# Patient Record
Sex: FEMALE | Race: WHITE | HISPANIC OR LATINO | Employment: FULL TIME | URBAN - METROPOLITAN AREA
[De-identification: names, ages, dates, MRNs, and addresses within clinical notes are randomized per-mention and may not be internally consistent; named-entity substitution may affect disease eponyms.]

---

## 2017-01-16 ENCOUNTER — OFFICE VISIT (OUTPATIENT)
Dept: CARDIOLOGY | Facility: MEDICAL CENTER | Age: 27
End: 2017-01-16
Payer: MEDICAID

## 2017-01-16 VITALS
SYSTOLIC BLOOD PRESSURE: 110 MMHG | BODY MASS INDEX: 20.16 KG/M2 | OXYGEN SATURATION: 100 % | HEART RATE: 78 BPM | DIASTOLIC BLOOD PRESSURE: 70 MMHG | WEIGHT: 100 LBS | HEIGHT: 59 IN

## 2017-01-16 DIAGNOSIS — R00.2 PALPITATIONS: ICD-10-CM

## 2017-01-16 PROCEDURE — 99214 OFFICE O/P EST MOD 30 MIN: CPT | Performed by: INTERNAL MEDICINE

## 2017-01-16 ASSESSMENT — ENCOUNTER SYMPTOMS
BRUISES/BLEEDS EASILY: 0
PND: 0
WEIGHT LOSS: 0
NERVOUS/ANXIOUS: 1
MUSCULOSKELETAL NEGATIVE: 1
COUGH: 0
EYES NEGATIVE: 1
MEMORY LOSS: 0
DEPRESSION: 0
INSOMNIA: 0
DIZZINESS: 0
BLURRED VISION: 0
LOSS OF CONSCIOUSNESS: 0
SHORTNESS OF BREATH: 0

## 2017-01-16 NOTE — MR AVS SNAPSHOT
"        Lotus Saez   2017 3:45 PM   Office Visit   MRN: [id number]    Department:  [facility]   Dept Phone:  [phone]    Description:  Female : 1990   Provider:  Faby Carlos M.D.           Allergies as of 2017     No Known Allergies      You were diagnosed with     Palpitations   [785.1.ICD-9-CM]         Vital Signs     Blood Pressure Pulse Height Weight Body Mass Index Oxygen Saturation    110/70 mmHg 78 1.499 m (4' 11\") 45.36 kg (100 lb) 20.19 kg/m2 100%    Smoking Status                   Never Smoker            Basic Information     Date Of Birth Sex Race Ethnicity Preferred Language    1990 Female White  Origin (Hungarian,South Korean,Vietnamese,Joshua, etc) English      Problem List              ICD-10-CM Priority Class Noted - Resolved    Palpitations R00.2   2017 - Present      Health Maintenance        Date Due Completion Dates    IMM DTaP/Tdap/Td Vaccine (1 - Tdap) 8/10/2007 ---    PAP SMEAR 8/10/2009 ---    IMM INFLUENZA (1) 2016 ---            Current Immunizations     No immunizations on file.      Below and/or attached are the medications your provider expects you to take. Review all of your home medications and newly ordered medications with your provider and/or pharmacist. Follow medication instructions as directed by your provider and/or pharmacist. Please keep your medication list with you and share with your provider. Update the information when medications are discontinued, doses are changed, or new medications (including over-the-counter products) are added; and carry medication information at all times in the event of emergency situations     Allergies:  No Known Allergies          Medications  Valid as of: 2017 -  4:00 PM    Generic Name Brand Name Tablet Size Instructions for use    Cod Liver Oil   Take  by mouth.        DiltiaZEM HCl Coated Beads (CAPSULE SR 24 HR) CARDIZEM  MG Take 1 Cap by mouth every day.       " MethIMAzole (Tab) TAPAZOLE 5 MG Take 1 Tab by mouth every day.        .                 Medicines prescribed today were sent to:     [id number] - [place], [place] - [address]    [address] [facility] [address] [facility] [place] [address]    Phone: [phone] Fax: [phone]    Open 24 Hours?: No      Medication refill instructions:       If your prescription bottle indicates you have medication refills left, it is not necessary to call your provider’s office. Please contact your pharmacy and they will refill your medication.    If your prescription bottle indicates you do not have any refills left, you may request refills at any time through one of the following ways: The online Kynded system (except Urgent Care), by calling your provider’s office, or by asking your pharmacy to contact your provider’s office with a refill request. Medication refills are processed only during regular business hours and may not be available until the next business day. Your provider may request additional information or to have a follow-up visit with you prior to refilling your medication.   *Please Note: Medication refills are assigned a new Rx number when refilled electronically. Your pharmacy may indicate that no refills were authorized even though a new prescription for the same medication is available at the pharmacy. Please request the medicine by name with the pharmacy before contacting your provider for a refill.           Kynded Access Code: [email or web address]  Expires: 2/15/2017  4:00 PM    Kynded  A secure, online tool to manage your health information     [employer]’s Kynded® is a secure, online tool that connects you to your personalized health information from the privacy of your home -- day or night - making it very easy for you to manage your healthcare. Once the activation process is completed, you can even access your medical information using the Kynded kelley, which is available for free in the Apple Kelley store or Google Play  store.     Accelera provides the following levels of access (as shown below):   My Chart Features   [facility] Doctor [facility]  Specialists [facility]  Urgent  Care [facility]  Primary Care  Doctor   Email your healthcare team securely and privately 24/7 X X X    Manage appointments: schedule your next appointment; view details of past/upcoming appointments X      Request prescription refills. X      View recent personal medical records, including lab and immunizations X X X X   View health record, including health history, allergies, medications X X X X   Read reports about your outpatient visits, procedures, consult and ER notes X X X X   See your discharge summary, which is a recap of your hospital and/or ER visit that includes your diagnosis, lab results, and care plan. X X       How to register for Accelera:  1. Go to  [email or web address].  2. Click on the Sign Up Now box, which takes you to the New Member Sign Up page. You will need to provide the following information:  a. Enter your Accelera Access Code exactly as it appears at the top of this page. (You will not need to use this code after you’ve completed the sign-up process. If you do not sign up before the expiration date, you must request a new code.)   b. Enter your date of birth.   c. Enter your home email address.   d. Click Submit, and follow the next screen’s instructions.  3. Create a Accelera ID. This will be your Accelera login ID and cannot be changed, so think of one that is secure and easy to remember.  4. Create a Accelera password. You can change your password at any time.  5. Enter your Password Reset Question and Answer. This can be used at a later time if you forget your password.   6. Enter your e-mail address. This allows you to receive e-mail notifications when new information is available in Accelera.  7. Click Sign Up. You can now view your health information.    For assistance activating your Accelera account, call [phone]  [phone]

## 2017-01-16 NOTE — Clinical Note
[facility]   [address], [address]  [place] [address]  Phone: [phone]  Fax: [phone]              Lotus Saez  1990    Encounter Date: 1/16/2017    Faby Carlos M.D.          PROGRESS NOTE:  Subjective:   Lotus Saez is a 26 y.o. female who presents today in follow-up in regards to her palpitations    She is switching care from her primary cardiologist who she saw 3 months ago    She is in with her partner, he is reading on his cell phone and does not communicate with me      She states that she stays at home with her 2-year-old and 5-year-old. She has had a very stressful year but denies trauma or abuse. She is a business major    She did not take the medications. She says she did not see eye to eye with her other cardiologist    History reviewed. No pertinent past medical history.  History reviewed. No pertinent past surgical history.  Family History   Problem Relation Age of Onset   • Heart Disease Mother      History   Smoking status   • Never Smoker    Smokeless tobacco   • Never Used     No Known Allergies  Outpatient Encounter Prescriptions as of 1/16/2017   Medication Sig Dispense Refill   • methimazole (TAPAZOLE) 5 MG Tab Take 1 Tab by mouth every day.  2   • Cod Liver Oil (COD LIVER PO) Take  by mouth.     • diltiazem CD (CARDIZEM CD) 120 MG CAPSULE SR 24 HR Take 1 Cap by mouth every day. (Patient not taking: Reported on 1/16/2017) 30 Cap 3     No facility-administered encounter medications on file as of 1/16/2017.     Review of Systems   Constitutional: Negative for weight loss and malaise/fatigue.   Eyes: Negative.  Negative for blurred vision.   Respiratory: Negative for cough and shortness of breath.    Cardiovascular: Negative for chest pain, leg swelling and PND.   Musculoskeletal: Negative.    Neurological: Negative for dizziness and loss of consciousness.   Endo/Heme/Allergies: Does not bruise/bleed easily.    "  Psychiatric/Behavioral: Negative for depression, suicidal ideas and memory loss. The patient is nervous/anxious. The patient does not have insomnia.    All other systems reviewed and are negative.       Objective:   /70 mmHg  Pulse 78  Ht 1.499 m (4' 11\")  Wt 45.36 kg (100 lb)  BMI 20.19 kg/m2  SpO2 100%    Physical Exam   Constitutional: She is oriented to person, place, and time.   Thin, tremulous, anxious and diminutive   Cardiovascular: Normal rate, regular rhythm and intact distal pulses.    No murmur heard.  Pulmonary/Chest: Breath sounds normal.   Musculoskeletal: She exhibits no edema.   Neurological: She is alert and oriented to person, place, and time.   Skin: Skin is warm and dry.       Assessment:     1. Palpitations         Medical Decision Making:  Today's Assessment / Status / Plan:     I personally looked at the images of her echocardiogram done last year. It was normal and reassuring. We looked at her blood work and her TSH in August was normal    Hyperthyroidism on methimazole area normal thyroid axis. Palpitations with a structurally normal heart    We looked at her echo together, we spoke for more than 25 minutes about benign palpitations. We talked about stressors and hyperthyroidism. She'll follow with her primary care doctor and let me know if needed. I told her she can take her diltiazem as needed. She voices understanding and will let me know if she has setbacks    I will be happy to order a three-week monitor if needed      Honey Colon PA-C  [address]  [place] [id number]  VIA Facsimile: [phone]                   "

## 2017-01-17 NOTE — PROGRESS NOTES
"Subjective:   Lotus Saez is a 26 y.o. female who presents today in follow-up in regards to her palpitations    She is switching care from her primary cardiologist who she saw 3 months ago    She is in with her partner, he is reading on his cell phone and does not communicate with me      She states that she stays at home with her 2-year-old and 5-year-old. She has had a very stressful year but denies trauma or abuse. She is a business major    She did not take the medications. She says she did not see eye to eye with her other cardiologist    History reviewed. No pertinent past medical history.  History reviewed. No pertinent past surgical history.  Family History   Problem Relation Age of Onset   • Heart Disease Mother      History   Smoking status   • Never Smoker    Smokeless tobacco   • Never Used     No Known Allergies  Outpatient Encounter Prescriptions as of 1/16/2017   Medication Sig Dispense Refill   • methimazole (TAPAZOLE) 5 MG Tab Take 1 Tab by mouth every day.  2   • Cod Liver Oil (COD LIVER PO) Take  by mouth.     • diltiazem CD (CARDIZEM CD) 120 MG CAPSULE SR 24 HR Take 1 Cap by mouth every day. (Patient not taking: Reported on 1/16/2017) 30 Cap 3     No facility-administered encounter medications on file as of 1/16/2017.     Review of Systems   Constitutional: Negative for weight loss and malaise/fatigue.   Eyes: Negative.  Negative for blurred vision.   Respiratory: Negative for cough and shortness of breath.    Cardiovascular: Negative for chest pain, leg swelling and PND.   Musculoskeletal: Negative.    Neurological: Negative for dizziness and loss of consciousness.   Endo/Heme/Allergies: Does not bruise/bleed easily.   Psychiatric/Behavioral: Negative for depression, suicidal ideas and memory loss. The patient is nervous/anxious. The patient does not have insomnia.    All other systems reviewed and are negative.       Objective:   /70 mmHg  Pulse 78  Ht 1.499 m (4' 11\")  Wt 45.36 " kg (100 lb)  BMI 20.19 kg/m2  SpO2 100%    Physical Exam   Constitutional: She is oriented to person, place, and time.   Thin, tremulous, anxious and diminutive   Cardiovascular: Normal rate, regular rhythm and intact distal pulses.    No murmur heard.  Pulmonary/Chest: Breath sounds normal.   Musculoskeletal: She exhibits no edema.   Neurological: She is alert and oriented to person, place, and time.   Skin: Skin is warm and dry.       Assessment:     1. Palpitations         Medical Decision Making:  Today's Assessment / Status / Plan:     I personally looked at the images of her echocardiogram done last year. It was normal and reassuring. We looked at her blood work and her TSH in August was normal    Hyperthyroidism on methimazole area normal thyroid axis. Palpitations with a structurally normal heart    We looked at her echo together, we spoke for more than 25 minutes about benign palpitations. We talked about stressors and hyperthyroidism. She'll follow with her primary care doctor and let me know if needed. I told her she can take her diltiazem as needed. She voices understanding and will let me know if she has setbacks    I will be happy to order a three-week monitor if needed

## 2019-11-14 ENCOUNTER — OFFICE VISIT (OUTPATIENT)
Dept: URGENT CARE | Facility: PHYSICIAN GROUP | Age: 29
End: 2019-11-14
Payer: COMMERCIAL

## 2019-11-14 VITALS
RESPIRATION RATE: 12 BRPM | OXYGEN SATURATION: 99 % | HEART RATE: 81 BPM | BODY MASS INDEX: 23.59 KG/M2 | TEMPERATURE: 98.7 F | SYSTOLIC BLOOD PRESSURE: 116 MMHG | HEIGHT: 59 IN | WEIGHT: 117 LBS | DIASTOLIC BLOOD PRESSURE: 70 MMHG

## 2019-11-14 DIAGNOSIS — J31.0 RHINITIS, UNSPECIFIED TYPE: ICD-10-CM

## 2019-11-14 DIAGNOSIS — H66.002 NON-RECURRENT ACUTE SUPPURATIVE OTITIS MEDIA OF LEFT EAR WITHOUT SPONTANEOUS RUPTURE OF TYMPANIC MEMBRANE: ICD-10-CM

## 2019-11-14 PROCEDURE — 99204 OFFICE O/P NEW MOD 45 MIN: CPT | Performed by: NURSE PRACTITIONER

## 2019-11-14 RX ORDER — AMOXICILLIN AND CLAVULANATE POTASSIUM 875; 125 MG/1; MG/1
1 TABLET, FILM COATED ORAL 2 TIMES DAILY
Qty: 20 TAB | Refills: 0 | Status: SHIPPED | OUTPATIENT
Start: 2019-11-14 | End: 2019-11-24

## 2019-11-14 RX ORDER — FLUTICASONE PROPIONATE 50 MCG
SPRAY, SUSPENSION (ML) NASAL
Qty: 16 G | Refills: 0 | Status: SHIPPED | OUTPATIENT
Start: 2019-11-14 | End: 2022-01-13

## 2019-11-14 RX ORDER — DULOXETIN HYDROCHLORIDE 60 MG/1
60 CAPSULE, DELAYED RELEASE ORAL DAILY
COMMUNITY
Start: 2019-11-12 | End: 2021-11-05 | Stop reason: SDUPTHER

## 2019-11-14 SDOH — HEALTH STABILITY: MENTAL HEALTH: HOW OFTEN DO YOU HAVE A DRINK CONTAINING ALCOHOL?: NEVER

## 2019-11-14 ASSESSMENT — ENCOUNTER SYMPTOMS
NEUROLOGICAL NEGATIVE: 1
CARDIOVASCULAR NEGATIVE: 1
FEVER: 0
CONSTITUTIONAL NEGATIVE: 1
COUGH: 1
SHORTNESS OF BREATH: 0

## 2019-11-14 NOTE — PROGRESS NOTES
"Subjective:     Lotus Saez is a 29 y.o. female who presents for Otalgia (x 3 days, L ear, poss infection, sharp pain)       Otalgia    There is pain in the left ear. This is a new problem. Episode onset: 4 days ago. The problem has been gradually worsening. Associated symptoms include coughing. Associated symptoms comments: Nasal congestion. Ear infections     PMH:  has no past medical history on file.    MEDS:   Current Outpatient Medications:   •  DULoxetine (CYMBALTA) 60 MG Cap DR Particles delayed-release capsule, Take 60 mg by mouth every day., Disp: , Rfl:   •  amoxicillin-clavulanate (AUGMENTIN) 875-125 MG Tab, Take 1 Tab by mouth 2 times a day for 10 days., Disp: 20 Tab, Rfl: 0  •  fluticasone (FLONASE) 50 MCG/ACT nasal spray, 2 sprays in each nostril once a day, Disp: 16 g, Rfl: 0  •  methimazole (TAPAZOLE) 5 MG Tab, Take 1 Tab by mouth every day., Disp: , Rfl: 2  •  Cod Liver Oil (COD LIVER PO), Take  by mouth., Disp: , Rfl:   •  diltiazem CD (CARDIZEM CD) 120 MG CAPSULE SR 24 HR, Take 1 Cap by mouth every day. (Patient not taking: Reported on 1/16/2017), Disp: 30 Cap, Rfl: 3    ALLERGIES: No Known Allergies    SURGHX: History reviewed. No pertinent surgical history.    SOCHX:  reports that she has never smoked. She has never used smokeless tobacco. She reports that she does not drink alcohol or use drugs.     FH: Reviewed with patient, not pertinent to this visit.    Review of Systems   Constitutional: Negative.  Negative for fever and malaise/fatigue.   HENT: Positive for congestion and ear pain.    Respiratory: Positive for cough. Negative for shortness of breath.    Cardiovascular: Negative.    Neurological: Negative.    All other systems reviewed and are negative.    Objective:     /70   Pulse 81   Temp 37.1 °C (98.7 °F) (Temporal)   Resp 12   Ht 1.499 m (4' 11\")   Wt 53.1 kg (117 lb)   SpO2 99%   BMI 23.63 kg/m²     Physical Exam  Vitals signs reviewed.   Constitutional:       " General: She is not in acute distress.     Appearance: She is well-developed. She is not toxic-appearing or diaphoretic.   HENT:      Head: Normocephalic.      Right Ear: Tympanic membrane and external ear normal.      Left Ear: External ear normal. A middle ear effusion is present. Tympanic membrane is erythematous and bulging. Tympanic membrane is not perforated.      Nose: Mucosal edema and rhinorrhea present.      Mouth/Throat:      Mouth: Mucous membranes are moist.      Pharynx: Oropharynx is clear. Uvula midline.   Eyes:      Conjunctiva/sclera: Conjunctivae normal.      Pupils: Pupils are equal, round, and reactive to light.   Neck:      Musculoskeletal: Normal range of motion.   Cardiovascular:      Rate and Rhythm: Normal rate and regular rhythm.      Pulses: Normal pulses.      Heart sounds: Normal heart sounds.   Pulmonary:      Effort: Pulmonary effort is normal. No respiratory distress.      Breath sounds: Normal breath sounds. No decreased breath sounds.   Abdominal:      General: Bowel sounds are normal.      Palpations: Abdomen is soft.      Tenderness: There is no tenderness.   Musculoskeletal: Normal range of motion.         General: No deformity.   Lymphadenopathy:      Cervical: No cervical adenopathy.   Skin:     General: Skin is warm and dry.      Coloration: Skin is not pale.   Neurological:      Mental Status: She is alert and oriented to person, place, and time.      Sensory: No sensory deficit.      Coordination: Coordination normal.   Psychiatric:         Behavior: Behavior normal. Behavior is cooperative.          Assessment/Plan:     1. Non-recurrent acute suppurative otitis media of left ear without spontaneous rupture of tympanic membrane  - amoxicillin-clavulanate (AUGMENTIN) 875-125 MG Tab; Take 1 Tab by mouth 2 times a day for 10 days.  Dispense: 20 Tab; Refill: 0    2. Rhinitis, unspecified type  - fluticasone (FLONASE) 50 MCG/ACT nasal spray; 2 sprays in each nostril once a day   Dispense: 16 g; Refill: 0    Rx as above sent electronically.    Discussed decongestants (e.g. Sudafed), antihistamines, Flonase, and nasal saline rinses/neti pot.    Discussed close monitoring and supportive measures including increasing fluids and rest.    Vital signs stable, afebrile, asymptomatic, no acute distress.    Warning signs reviewed. Return precautions discussed.    Patient advised to: Return for 1) Symptoms don't improve or worsen, or go to ER, 2) Follow up with primary care in 7-10 days.    Differential diagnosis, natural history, supportive care, and indications for immediate follow-up discussed. All questions answered. Patient agrees with the plan of care.    Billing note: patient billed as a new patient as the patient has not received any professional medical services from myself or another provider of the same specialty (family medicine) who belongs to the same group practice within the previous 3 years.

## 2021-11-05 RX ORDER — AMITRIPTYLINE HYDROCHLORIDE 10 MG/1
10 TABLET, FILM COATED ORAL NIGHTLY
COMMUNITY
Start: 2021-08-24 | End: 2022-10-10 | Stop reason: SDUPTHER

## 2021-11-05 RX ORDER — AMITRIPTYLINE HYDROCHLORIDE 10 MG/1
TABLET, FILM COATED ORAL
COMMUNITY
Start: 2021-08-24 | End: 2021-11-05

## 2021-11-09 RX ORDER — DULOXETIN HYDROCHLORIDE 60 MG/1
60 CAPSULE, DELAYED RELEASE ORAL DAILY
Qty: 90 CAPSULE | Refills: 0 | Status: SHIPPED | OUTPATIENT
Start: 2021-11-09 | End: 2022-02-25 | Stop reason: SDUPTHER

## 2022-01-13 ENCOUNTER — OFFICE VISIT (OUTPATIENT)
Dept: URGENT CARE | Facility: PHYSICIAN GROUP | Age: 32
End: 2022-01-13

## 2022-01-13 VITALS
OXYGEN SATURATION: 99 % | HEIGHT: 59 IN | WEIGHT: 120 LBS | TEMPERATURE: 98.2 F | BODY MASS INDEX: 24.19 KG/M2 | DIASTOLIC BLOOD PRESSURE: 68 MMHG | HEART RATE: 79 BPM | RESPIRATION RATE: 20 BRPM | SYSTOLIC BLOOD PRESSURE: 122 MMHG

## 2022-01-13 DIAGNOSIS — H66.92 ACUTE LEFT OTITIS MEDIA: ICD-10-CM

## 2022-01-13 DIAGNOSIS — H60.312 ACUTE DIFFUSE OTITIS EXTERNA OF LEFT EAR: ICD-10-CM

## 2022-01-13 DIAGNOSIS — H92.02 ACUTE OTALGIA, LEFT: ICD-10-CM

## 2022-01-13 PROCEDURE — 99213 OFFICE O/P EST LOW 20 MIN: CPT | Performed by: FAMILY MEDICINE

## 2022-01-13 RX ORDER — AMOXICILLIN 500 MG/1
500 CAPSULE ORAL 3 TIMES DAILY
Qty: 21 CAPSULE | Refills: 0 | Status: SHIPPED | OUTPATIENT
Start: 2022-01-13 | End: 2022-01-20

## 2022-01-13 ASSESSMENT — ENCOUNTER SYMPTOMS
VOMITING: 0
CHILLS: 0
DIZZINESS: 0
FEVER: 0
SORE THROAT: 0
MYALGIAS: 0
NAUSEA: 0
COUGH: 0
SHORTNESS OF BREATH: 0

## 2022-01-14 NOTE — PATIENT INSTRUCTIONS
Otitis Media, Adult    Otitis media means that the middle ear is red and swollen (inflamed) and full of fluid. The condition usually goes away on its own.  Follow these instructions at home:  · Take over-the-counter and prescription medicines only as told by your doctor.  · If you were prescribed an antibiotic medicine, take it as told by your doctor. Do not stop taking the antibiotic even if you start to feel better.  · Keep all follow-up visits as told by your doctor. This is important.  Contact a doctor if:  · You have bleeding from your nose.  · There is a lump on your neck.  · You are not getting better in 5 days.  · You feel worse instead of better.  Get help right away if:  · You have pain that is not helped with medicine.  · You have swelling, redness, or pain around your ear.  · You get a stiff neck.  · You cannot move part of your face (paralyzed).  · You notice that the bone behind your ear hurts when you touch it.  · You get a very bad headache.  Summary  · Otitis media means that the middle ear is red, swollen, and full of fluid.  · This condition usually goes away on its own. In some cases, treatment may be needed.  · If you were prescribed an antibiotic medicine, take it as told by your doctor.  This information is not intended to replace advice given to you by your health care provider. Make sure you discuss any questions you have with your health care provider.  Document Released: 06/05/2009 Document Revised: 11/30/2018 Document Reviewed: 01/08/2018  Elsevier Patient Education © 2020 Elsevier Inc[employer]  Otitis Externa    Otitis externa is an infection of the outer ear canal. The outer ear canal is the area between the outside of the ear and the eardrum. Otitis externa is sometimes called swimmer's ear.  What are the causes?  Common causes of this condition include:  · Swimming in dirty water.  · Moisture in the ear.  · An injury to the inside of the ear.  · An object stuck in the ear.  · A cut or scrape  on the outside of the ear.  What increases the risk?  You are more likely to get this condition if you go swimming often.  What are the signs or symptoms?  · Itching in the ear. This is often the first symptom.  · Swelling of the ear.  · Redness in the ear.  · Ear pain. The pain may get worse when you pull on your ear.  · Pus coming from the ear.  How is this treated?  This condition may be treated with:  · Antibiotic ear drops. These are often given for 10-14 days.  · Medicines to reduce itching and swelling.  Follow these instructions at home:  · If you were given antibiotic ear drops, use them as told by your doctor. Do not stop using them even if your condition gets better.  · Take over-the-counter and prescription medicines only as told by your doctor.  · Avoid getting water in your ears as told by your doctor. You may be told to avoid swimming or water sports for a few days.  · Keep all follow-up visits as told by your doctor. This is important.  How is this prevented?  · Keep your ears dry. Use the corner of a towel to dry your ears after you swim or bathe.  · Try not to scratch or put things in your ear. Doing these things makes it easier for germs to grow in your ear.  · Avoid swimming in lakes, dirty water, or pools that may not have the right amount of a chemical called chlorine.  Contact a doctor if:  · You have a fever.  · Your ear is still red, swollen, or painful after 3 days.  · You still have pus coming from your ear after 3 days.  · Your redness, swelling, or pain gets worse.  · You have a really bad headache.  · You have redness, swelling, pain, or tenderness behind your ear.  Summary  · Otitis externa is an infection of the outer ear canal.  · Symptoms include pain, redness, and swelling of the ear.  · If you were given antibiotic ear drops, use them as told by your doctor. Do not stop using them even if your condition gets better.  · Try not to scratch or put things in your ear.  This  information is not intended to replace advice given to you by your health care provider. Make sure you discuss any questions you have with your health care provider.  Document Released: 06/05/2009 Document Revised: 05/24/2019 Document Reviewed: 05/24/2019  Elsevier Patient Education © 2020 Elsevier Inc.

## 2022-01-14 NOTE — PROGRESS NOTES
Subjective:   Lotus Saez is a 31 y.o. female who presents for Ear Pain (left; last night)        Otalgia   There is pain in the left ear. This is a new problem. The current episode started yesterday. The problem occurs constantly. Pertinent negatives include no coughing, rash, sore throat or vomiting. Associated symptoms comments: Similar symptoms to previous episodes of otitis media. Treatments tried: Relative rest. The treatment provided no relief.     PMH:  has no past medical history on file.  MEDS:   Current Outpatient Medications:   •  amoxicillin (AMOXIL) 500 MG Cap, Take 1 Capsule by mouth 3 times a day for 7 days., Disp: 21 Capsule, Rfl: 0  •  neomycin sulf/polymyx B sulf/HC soln (CORTISPORIN HC SOL) 3.5-55934-0 Solution, Administer 3 Drops into the left ear 3 times a day for 7 days., Disp: 10 mL, Rfl: 1  •  DULoxetine (CYMBALTA) 60 MG Cap DR Particles delayed-release capsule, Take 1 Capsule by mouth every day., Disp: 90 Capsule, Rfl: 0  •  amitriptyline (ELAVIL) 10 MG Tab, AMITRIPTYLINE HCL 10 MG TABS, Disp: , Rfl:   •  methimazole (TAPAZOLE) 5 MG Tab, Take 1 Tab by mouth every day., Disp: , Rfl: 2  •  fluticasone (FLONASE) 50 MCG/ACT nasal spray, 2 sprays in each nostril once a day, Disp: 16 g, Rfl: 0  •  Cod Liver Oil (COD LIVER PO), Take  by mouth., Disp: , Rfl:   •  diltiazem CD (CARDIZEM CD) 120 MG CAPSULE SR 24 HR, Take 1 Cap by mouth every day. (Patient not taking: Reported on 1/16/2017), Disp: 30 Cap, Rfl: 3  ALLERGIES: No Known Allergies  SURGHX: History reviewed. No pertinent surgical history.  SOCHX:  reports that she has never smoked. She has never used smokeless tobacco. She reports that she does not drink alcohol and does not use drugs.  FH:   Family History   Problem Relation Age of Onset   • Heart Disease Mother      Review of Systems   Constitutional: Negative for chills and fever.   HENT: Positive for ear pain. Negative for sore throat.    Respiratory: Negative for cough and  "shortness of breath.    Gastrointestinal: Negative for nausea and vomiting.   Musculoskeletal: Negative for myalgias.   Skin: Negative for rash.   Neurological: Negative for dizziness.        Objective:   /68 (BP Location: Right arm, Patient Position: Sitting, BP Cuff Size: Adult)   Pulse 79   Temp 36.8 °C (98.2 °F) (Temporal)   Resp 20   Ht 1.499 m (4' 11\")   Wt 54.4 kg (120 lb)   SpO2 99%   BMI 24.24 kg/m²   Physical Exam  Vitals and nursing note reviewed.   Constitutional:       General: She is not in acute distress.     Appearance: She is well-developed.   HENT:      Head: Normocephalic and atraumatic.      Right Ear: External ear normal. Tympanic membrane is not erythematous or bulging.      Left Ear: External ear normal. Tympanic membrane is erythematous and bulging.      Ears:      Comments: Left canal erythematous, edematous     Nose: Rhinorrhea present.      Mouth/Throat:      Mouth: Mucous membranes are moist.      Pharynx: Posterior oropharyngeal erythema present. No oropharyngeal exudate.   Eyes:      Conjunctiva/sclera: Conjunctivae normal.   Cardiovascular:      Rate and Rhythm: Normal rate.   Pulmonary:      Effort: Pulmonary effort is normal. No respiratory distress.      Breath sounds: Normal breath sounds. No wheezing or rhonchi.   Abdominal:      General: There is no distension.   Musculoskeletal:         General: Normal range of motion.   Skin:     General: Skin is warm and dry.   Neurological:      General: No focal deficit present.      Mental Status: She is alert and oriented to person, place, and time. Mental status is at baseline.      Gait: Gait (gait at baseline) normal.   Psychiatric:         Judgment: Judgment normal.           Assessment/Plan:   1. Acute diffuse otitis externa of left ear  - neomycin sulf/polymyx B sulf/HC soln (CORTISPORIN HC SOL) 3.5-57250-0 Solution; Administer 3 Drops into the left ear 3 times a day for 7 days.  Dispense: 10 mL; Refill: 1    2. Acute " left otitis media  - amoxicillin (AMOXIL) 500 MG Cap; Take 1 Capsule by mouth 3 times a day for 7 days.  Dispense: 21 Capsule; Refill: 0    3. Acute otalgia, left  - amoxicillin (AMOXIL) 500 MG Cap; Take 1 Capsule by mouth 3 times a day for 7 days.  Dispense: 21 Capsule; Refill: 0  - neomycin sulf/polymyx B sulf/HC soln (CORTISPORIN HC SOL) 3.5-66768-1 Solution; Administer 3 Drops into the left ear 3 times a day for 7 days.  Dispense: 10 mL; Refill: 1        Medical Decision Making/Course:  In the course of preparing for this visit with review of the pertinent past medical history, recent and past clinic visits, current medications, and performing chart, immunization, medical history and medication reconciliation, and in the further course of obtaining the current history pertinent to the clinic visit today, performing an exam and evaluation, ordering and independently evaluating labs, tests, and/or procedures including in the context of shared decision making the patient deferred SARS-CoV-2 by PCR testing today, prescribing any recommended new medications as noted above, providing any pertinent counseling and education and recommending further coordination of care, at least  10 minutes of total time were spent during this encounter.      Discussed close monitoring, return precautions, and supportive measures of maintaining adequate fluid hydration and caloric intake, relative rest and symptom management as needed for pain and/or fever.    Differential diagnosis, natural history, supportive care, and indications for immediate follow-up discussed.     Advised the patient to follow-up with the primary care physician for recheck, reevaluation, and consideration of further management.    Please note that this dictation was created using voice recognition software. I have worked with consultants from the vendor as well as technical experts from [employer] to optimize the interface. I have made every reasonable attempt  to correct obvious errors, but I expect that there are errors of grammar and possibly content that I did not discover before finalizing the note.

## 2022-01-31 ENCOUNTER — EH NON-PROVIDER (OUTPATIENT)
Dept: OCCUPATIONAL MEDICINE | Facility: CLINIC | Age: 32
End: 2022-01-31

## 2022-01-31 ENCOUNTER — EMPLOYEE HEALTH (OUTPATIENT)
Dept: OCCUPATIONAL MEDICINE | Facility: CLINIC | Age: 32
End: 2022-01-31

## 2022-01-31 ENCOUNTER — HOSPITAL ENCOUNTER (OUTPATIENT)
Facility: MEDICAL CENTER | Age: 32
End: 2022-01-31
Attending: PREVENTIVE MEDICINE
Payer: COMMERCIAL

## 2022-01-31 DIAGNOSIS — Z02.1 PRE-EMPLOYMENT DRUG SCREENING: ICD-10-CM

## 2022-01-31 DIAGNOSIS — Z02.1 PRE-EMPLOYMENT HEALTH SCREENING EXAMINATION: ICD-10-CM

## 2022-01-31 DIAGNOSIS — Z02.1 PRE-EMPLOYMENT DRUG SCREENING: Primary | ICD-10-CM

## 2022-01-31 LAB
AMP AMPHETAMINE: NORMAL
BAR BARBITURATES: NORMAL
BZO BENZODIAZEPINES: NORMAL
COC COCAINE: NORMAL
INT CON NEG: NORMAL
INT CON POS: NORMAL
MDMA ECSTASY: NORMAL
MET METHAMPHETAMINES: NORMAL
MTD METHADONE: NORMAL
OPI OPIATES: NORMAL
OXY OXYCODONE: NORMAL
PCP PHENCYCLIDINE: NORMAL
POC URINE DRUG SCREEN OCDRS: NEGATIVE
THC: NORMAL

## 2022-01-31 PROCEDURE — 86765 RUBEOLA ANTIBODY: CPT | Performed by: PREVENTIVE MEDICINE

## 2022-01-31 PROCEDURE — 8915 PR COMPREHENSIVE PHYSICAL: Performed by: PREVENTIVE MEDICINE

## 2022-01-31 PROCEDURE — 86480 TB TEST CELL IMMUN MEASURE: CPT | Performed by: PREVENTIVE MEDICINE

## 2022-01-31 PROCEDURE — 86735 MUMPS ANTIBODY: CPT | Performed by: PREVENTIVE MEDICINE

## 2022-01-31 PROCEDURE — 86762 RUBELLA ANTIBODY: CPT | Performed by: PREVENTIVE MEDICINE

## 2022-01-31 PROCEDURE — 80305 DRUG TEST PRSMV DIR OPT OBS: CPT | Performed by: PREVENTIVE MEDICINE

## 2022-01-31 PROCEDURE — 86787 VARICELLA-ZOSTER ANTIBODY: CPT | Performed by: PREVENTIVE MEDICINE

## 2022-02-02 LAB
GAMMA INTERFERON BACKGROUND BLD IA-ACNC: 0.05 IU/ML
M TB IFN-G BLD-IMP: NEGATIVE
M TB IFN-G CD4+ BCKGRND COR BLD-ACNC: 0.07 IU/ML
MEV IGG SER IA-ACNC: 0.29
MITOGEN IGNF BCKGRD COR BLD-ACNC: >10 IU/ML
MUV IGG SER IA-ACNC: 0.88
QFT TB2 - NIL TBQ2: 0.04 IU/ML
RUBV AB SER QL: 63.8 IU/ML
VZV IGG SER IA-ACNC: 0.04

## 2022-02-04 ENCOUNTER — PATIENT MESSAGE (OUTPATIENT)
Dept: MEDICAL GROUP | Facility: MEDICAL CENTER | Age: 32
End: 2022-02-04

## 2022-02-04 NOTE — PATIENT COMMUNICATION
Phone Number Called: [phone] (home) [phone] (work)    Call outcome: sent pt a mess via my chart informing of lab results show low titers of MMR and varicella. I offered pt to schedule an bibi for vaccines but pt needs to check with her supervisor first. Pt. Will call back.

## 2022-02-26 ENCOUNTER — OFFICE VISIT (OUTPATIENT)
Dept: URGENT CARE | Facility: PHYSICIAN GROUP | Age: 32
End: 2022-02-26
Payer: COMMERCIAL

## 2022-02-26 VITALS
BODY MASS INDEX: 23.47 KG/M2 | OXYGEN SATURATION: 100 % | HEART RATE: 79 BPM | TEMPERATURE: 97.6 F | HEIGHT: 59 IN | SYSTOLIC BLOOD PRESSURE: 118 MMHG | WEIGHT: 116.4 LBS | DIASTOLIC BLOOD PRESSURE: 76 MMHG | RESPIRATION RATE: 14 BRPM

## 2022-02-26 DIAGNOSIS — H61.22 IMPACTED CERUMEN OF LEFT EAR: ICD-10-CM

## 2022-02-26 DIAGNOSIS — H65.02 ACUTE SEROUS OTITIS MEDIA OF LEFT EAR WITHOUT RUPTURE: ICD-10-CM

## 2022-02-26 PROBLEM — M79.7 FIBROMYALGIA: Status: ACTIVE | Noted: 2017-11-07

## 2022-02-26 PROBLEM — R22.30 SHOULDER MASS: Status: ACTIVE | Noted: 2018-08-29

## 2022-02-26 PROBLEM — L65.9 HAIR LOSS: Status: ACTIVE | Noted: 2018-06-05

## 2022-02-26 PROBLEM — N93.8 OTHER SPECIFIED ABNORMAL UTERINE AND VAGINAL BLEEDING: Status: ACTIVE | Noted: 2017-04-10

## 2022-02-26 PROBLEM — L08.9 TOE INFECTION: Status: ACTIVE | Noted: 2017-05-04

## 2022-02-26 PROBLEM — Z00.00 WELL ADULT EXAM: Status: ACTIVE | Noted: 2018-08-29

## 2022-02-26 PROBLEM — R21 RASH: Status: ACTIVE | Noted: 2018-02-09

## 2022-02-26 PROBLEM — Z00.8 OTHER SPECIFIED GENERAL MEDICAL EXAMINATIONS: Status: ACTIVE | Noted: 2017-12-06

## 2022-02-26 PROBLEM — E05.00 GRAVES' DISEASE: Status: ACTIVE | Noted: 2019-12-26

## 2022-02-26 PROBLEM — D48.5 NEOPLASM OF UNCERTAIN BEHAVIOR OF SKIN: Status: ACTIVE | Noted: 2017-07-11

## 2022-02-26 PROBLEM — Q82.9 SKIN ANOMALY: Status: ACTIVE | Noted: 2017-07-11

## 2022-02-26 PROBLEM — B34.9 NONSPECIFIC SYNDROME SUGGESTIVE OF VIRAL ILLNESS: Status: ACTIVE | Noted: 2018-03-23

## 2022-02-26 PROBLEM — Z13.6 SCREENING FOR HYPERTENSION: Status: ACTIVE | Noted: 2019-08-09

## 2022-02-26 PROCEDURE — 69210 REMOVE IMPACTED EAR WAX UNI: CPT | Mod: LT | Performed by: PHYSICIAN ASSISTANT

## 2022-02-26 PROCEDURE — 99213 OFFICE O/P EST LOW 20 MIN: CPT | Mod: 25 | Performed by: PHYSICIAN ASSISTANT

## 2022-02-26 RX ORDER — AMOXICILLIN 500 MG/1
500 CAPSULE ORAL 2 TIMES DAILY
Qty: 14 CAPSULE | Refills: 0 | Status: SHIPPED | OUTPATIENT
Start: 2022-02-26 | End: 2022-03-05

## 2022-02-26 ASSESSMENT — ENCOUNTER SYMPTOMS
CHILLS: 0
FEVER: 0
SORE THROAT: 1
RHINORRHEA: 0
VOMITING: 0
COUGH: 0
SINUS PAIN: 0
DIARRHEA: 0

## 2022-02-26 NOTE — TELEPHONE ENCOUNTER
Pt also is asking for a new referral to Endo within [facility] as she would like to keep all her provider within the organization.

## 2022-02-26 NOTE — PROGRESS NOTES
Subjective     Lotus Saez is a 31 y.o. female who presents with Otalgia (L ear pain, difficulty hearing. Used left over drops from ear infection in January, did not help. Pain in throat. Sx x 2days)    Medications:    • amitriptyline Tabs  • DULoxetine Cpep  • methimazole Tabs    Allergies: Patient has no known allergies.    Problem List: Lotus Saez does not have any pertinent problems on file.    Surgical History:  No past surgical history on file.    Past Social Hx: Lotus Saez  reports that she has never smoked. She has never used smokeless tobacco. She reports that she does not drink alcohol and does not use drugs.     Past Family Hx:  Lotus Saez family history includes Heart Disease in her mother.     Problem list, medications, and allergies reviewed by myself today in Epic.          Patient presents with:  Otalgia: L ear pain, difficulty hearing. Used left over drops from ear infection in January, did not help. Pain in throat. Sx x 2days        Otalgia   There is pain in the left ear. This is a new problem. The current episode started in the past 7 days. The problem occurs constantly. The problem has been gradually worsening. There has been no fever. Associated symptoms include a sore throat. Pertinent negatives include no coughing, diarrhea, ear discharge, rhinorrhea or vomiting. Hearing loss: muffled. She has tried ear drops for the symptoms. The treatment provided no relief. There is no history of a chronic ear infection, hearing loss or a tympanostomy tube.       Review of Systems   Constitutional: Negative for chills and fever.   HENT: Positive for ear pain and sore throat. Negative for congestion, ear discharge, rhinorrhea and sinus pain. Hearing loss: muffled.    Respiratory: Negative for cough.    Gastrointestinal: Negative for diarrhea and vomiting.   All other systems reviewed and are negative.             Objective     /76   Pulse 79   Temp 36.4 °C (97.6 °F)    "Resp 14   Ht 1.499 m (4' 11\")   Wt 52.8 kg (116 lb 6.4 oz)   SpO2 100%   BMI 23.51 kg/m²      Physical Exam  Vitals and nursing note reviewed.   Constitutional:       General: She is not in acute distress.     Appearance: Normal appearance. She is well-developed and normal weight. She is not ill-appearing or toxic-appearing.   HENT:      Head: Normocephalic and atraumatic.      Right Ear: Tympanic membrane and ear canal normal.      Left Ear: There is impacted cerumen.      Nose: Nose normal.      Mouth/Throat:      Lips: Pink.      Mouth: Mucous membranes are moist.      Pharynx: Uvula midline. No posterior oropharyngeal erythema.   Eyes:      Extraocular Movements: Extraocular movements intact.      Conjunctiva/sclera: Conjunctivae normal.      Pupils: Pupils are equal, round, and reactive to light.   Cardiovascular:      Rate and Rhythm: Normal rate and regular rhythm.      Heart sounds: Normal heart sounds.   Pulmonary:      Effort: Pulmonary effort is normal.   Abdominal:      Palpations: Abdomen is soft.   Musculoskeletal:         General: Normal range of motion.      Cervical back: Normal range of motion and neck supple.   Lymphadenopathy:      Head:      Left side of head: Posterior auricular adenopathy present.      Cervical: Cervical adenopathy present.      Left cervical: Superficial cervical adenopathy present.   Skin:     General: Skin is warm and dry.      Capillary Refill: Capillary refill takes less than 2 seconds.   Neurological:      Mental Status: She is alert and oriented to person, place, and time.      Gait: Gait normal.   Psychiatric:         Mood and Affect: Mood normal.               Procedure: Cerumen Removal  Risks and benefits of procedure discussed  Cerumen removed with curette and lavage after softening agent instilled  Patient tolerated well  Post procedure exam with clear canal and normal TM           Assessment & Plan              1. Acute serous otitis media of left ear without " rupture     2. Impacted cerumen of left ear         Patient was evaluated in clinic today while wearing appropriate personal protective equipment.      PT can begin or continue OTC medications, increase fluids and rest until symptoms improve.     PT to begin prescription medications today as discussed.      PT should follow up with PCP in 1-2 days for re-evaluation if symptoms have not improved.      Discussed red flags and reasons to return to UC or ED.      Pt and/or family verbalized understanding of diagnosis and follow up instructions and was offered informational handout on diagnosis.  PT discharged.

## 2022-03-03 RX ORDER — DULOXETIN HYDROCHLORIDE 60 MG/1
60 CAPSULE, DELAYED RELEASE ORAL DAILY
Qty: 90 CAPSULE | Refills: 3 | Status: SHIPPED | OUTPATIENT
Start: 2022-03-03 | End: 2022-10-10 | Stop reason: SDUPTHER

## 2022-03-07 ENCOUNTER — TELEPHONE (OUTPATIENT)
Dept: OCCUPATIONAL MEDICINE | Facility: CLINIC | Age: 32
End: 2022-03-07
Payer: COMMERCIAL

## 2022-03-07 NOTE — TELEPHONE ENCOUNTER
Titers are non-immune for MMR and VZV. Needs an appointment for both of the  vaccine series.     States she needs to work out a time with her supervisor. Gave her the scheduling line, [phone], and my direct line to call and make an appointment.

## 2022-03-18 ENCOUNTER — EH NON-PROVIDER (OUTPATIENT)
Dept: OCCUPATIONAL MEDICINE | Facility: CLINIC | Age: 32
End: 2022-03-18

## 2022-03-18 DIAGNOSIS — Z23 NEED FOR VACCINATION: Primary | ICD-10-CM

## 2022-03-18 PROCEDURE — 90707 MMR VACCINE SC: CPT | Performed by: NURSE PRACTITIONER

## 2022-03-18 PROCEDURE — 90716 VAR VACCINE LIVE SUBQ: CPT | Performed by: NURSE PRACTITIONER

## 2022-03-25 ENCOUNTER — EH NON-PROVIDER (OUTPATIENT)
Dept: OCCUPATIONAL MEDICINE | Facility: CLINIC | Age: 32
End: 2022-03-25

## 2022-04-22 ENCOUNTER — EH NON-PROVIDER (OUTPATIENT)
Dept: OCCUPATIONAL MEDICINE | Facility: CLINIC | Age: 32
End: 2022-04-22

## 2022-04-22 DIAGNOSIS — Z23 NEED FOR VACCINATION: Primary | ICD-10-CM

## 2022-04-22 PROCEDURE — 90716 VAR VACCINE LIVE SUBQ: CPT | Performed by: NURSE PRACTITIONER

## 2022-04-22 PROCEDURE — 90707 MMR VACCINE SC: CPT | Performed by: NURSE PRACTITIONER

## 2022-07-09 ENCOUNTER — HOSPITAL ENCOUNTER (OUTPATIENT)
Dept: LAB | Facility: MEDICAL CENTER | Age: 32
End: 2022-07-09
Attending: FAMILY MEDICINE
Payer: COMMERCIAL

## 2022-07-09 LAB
ANION GAP SERPL CALC-SCNC: 11 MMOL/L (ref 7–16)
BUN SERPL-MCNC: 14 MG/DL (ref 8–22)
CALCIUM SERPL-MCNC: 8.9 MG/DL (ref 8.5–10.5)
CHLORIDE SERPL-SCNC: 111 MMOL/L (ref 96–112)
CO2 SERPL-SCNC: 23 MMOL/L (ref 20–33)
CREAT SERPL-MCNC: 0.72 MG/DL (ref 0.5–1.4)
DHEA-S SERPL-MCNC: 321 UG/DL (ref 98.8–340)
ESTRADIOL SERPL-MCNC: 98.2 PG/ML
FSH SERPL-ACNC: 5.2 MIU/ML
GFR SERPLBLD CREATININE-BSD FMLA CKD-EPI: 113 ML/MIN/1.73 M 2
GLUCOSE SERPL-MCNC: 92 MG/DL (ref 65–99)
LH SERPL-ACNC: 6.6 IU/L
POTASSIUM SERPL-SCNC: 4.4 MMOL/L (ref 3.6–5.5)
PROGEST SERPL-MCNC: 0.27 NG/ML
PROLACTIN SERPL-MCNC: 13.7 NG/ML (ref 2.8–26)
SODIUM SERPL-SCNC: 145 MMOL/L (ref 135–145)

## 2022-07-09 PROCEDURE — 82627 DEHYDROEPIANDROSTERONE: CPT

## 2022-07-09 PROCEDURE — 84144 ASSAY OF PROGESTERONE: CPT

## 2022-07-09 PROCEDURE — 36415 COLL VENOUS BLD VENIPUNCTURE: CPT

## 2022-07-09 PROCEDURE — 84270 ASSAY OF SEX HORMONE GLOBUL: CPT

## 2022-07-09 PROCEDURE — 84146 ASSAY OF PROLACTIN: CPT

## 2022-07-09 PROCEDURE — 84403 ASSAY OF TOTAL TESTOSTERONE: CPT

## 2022-07-09 PROCEDURE — 82670 ASSAY OF TOTAL ESTRADIOL: CPT

## 2022-07-09 PROCEDURE — 83002 ASSAY OF GONADOTROPIN (LH): CPT

## 2022-07-09 PROCEDURE — 84402 ASSAY OF FREE TESTOSTERONE: CPT

## 2022-07-09 PROCEDURE — 80048 BASIC METABOLIC PNL TOTAL CA: CPT

## 2022-07-09 PROCEDURE — 83001 ASSAY OF GONADOTROPIN (FSH): CPT

## 2022-07-21 LAB
SHBG SERPL-SCNC: 55 NMOL/L (ref 25–122)
TESTOST FREE SERPL-MCNC: 5.5 PG/ML (ref 1.3–9.2)
TESTOST SERPL-MCNC: 45 NG/DL (ref 9–55)

## 2022-08-06 ENCOUNTER — HOSPITAL ENCOUNTER (OUTPATIENT)
Dept: LAB | Facility: MEDICAL CENTER | Age: 32
End: 2022-08-06
Attending: FAMILY MEDICINE
Payer: COMMERCIAL

## 2022-08-06 LAB
ESTRADIOL SERPL-MCNC: 150 PG/ML
FSH SERPL-ACNC: 4.6 MIU/ML
LH SERPL-ACNC: 6.4 IU/L

## 2022-08-06 PROCEDURE — 83001 ASSAY OF GONADOTROPIN (FSH): CPT

## 2022-08-06 PROCEDURE — 82670 ASSAY OF TOTAL ESTRADIOL: CPT

## 2022-08-06 PROCEDURE — 83002 ASSAY OF GONADOTROPIN (LH): CPT

## 2022-08-06 PROCEDURE — 36415 COLL VENOUS BLD VENIPUNCTURE: CPT

## 2022-08-12 ENCOUNTER — HOSPITAL ENCOUNTER (OUTPATIENT)
Dept: LAB | Facility: MEDICAL CENTER | Age: 32
End: 2022-08-12
Attending: FAMILY MEDICINE
Payer: COMMERCIAL

## 2022-08-12 LAB
ESTRADIOL SERPL-MCNC: 79.8 PG/ML
PROGEST SERPL-MCNC: 6.21 NG/ML

## 2022-08-12 PROCEDURE — 36415 COLL VENOUS BLD VENIPUNCTURE: CPT

## 2022-08-12 PROCEDURE — 84144 ASSAY OF PROGESTERONE: CPT

## 2022-08-12 PROCEDURE — 82670 ASSAY OF TOTAL ESTRADIOL: CPT

## 2022-08-18 ENCOUNTER — HOSPITAL ENCOUNTER (OUTPATIENT)
Dept: LAB | Facility: MEDICAL CENTER | Age: 32
End: 2022-08-18
Attending: FAMILY MEDICINE
Payer: COMMERCIAL

## 2022-08-18 LAB
ESTRADIOL SERPL-MCNC: 126 PG/ML
PROGEST SERPL-MCNC: 13.5 NG/ML

## 2022-08-18 PROCEDURE — 82670 ASSAY OF TOTAL ESTRADIOL: CPT

## 2022-08-18 PROCEDURE — 36415 COLL VENOUS BLD VENIPUNCTURE: CPT

## 2022-08-18 PROCEDURE — 84144 ASSAY OF PROGESTERONE: CPT

## 2022-08-22 ENCOUNTER — HOSPITAL ENCOUNTER (OUTPATIENT)
Dept: LAB | Facility: MEDICAL CENTER | Age: 32
End: 2022-08-22
Attending: FAMILY MEDICINE
Payer: COMMERCIAL

## 2022-08-22 LAB
ESTRADIOL SERPL-MCNC: 92.8 PG/ML
PROGEST SERPL-MCNC: 5.47 NG/ML

## 2022-08-22 PROCEDURE — 36415 COLL VENOUS BLD VENIPUNCTURE: CPT

## 2022-08-22 PROCEDURE — 82670 ASSAY OF TOTAL ESTRADIOL: CPT

## 2022-08-22 PROCEDURE — 84144 ASSAY OF PROGESTERONE: CPT

## 2022-10-10 ENCOUNTER — OFFICE VISIT (OUTPATIENT)
Dept: MEDICAL GROUP | Facility: CLINIC | Age: 32
End: 2022-10-10
Payer: COMMERCIAL

## 2022-10-10 VITALS
OXYGEN SATURATION: 97 % | HEART RATE: 72 BPM | BODY MASS INDEX: 24.53 KG/M2 | WEIGHT: 121.7 LBS | DIASTOLIC BLOOD PRESSURE: 79 MMHG | HEIGHT: 59 IN | SYSTOLIC BLOOD PRESSURE: 120 MMHG

## 2022-10-10 DIAGNOSIS — N92.6 ABNORMAL MENSES: ICD-10-CM

## 2022-10-10 DIAGNOSIS — E05.00 GRAVES' DISEASE: ICD-10-CM

## 2022-10-10 DIAGNOSIS — Z23 NEED FOR VACCINATION: ICD-10-CM

## 2022-10-10 DIAGNOSIS — M79.7 FIBROMYALGIA: ICD-10-CM

## 2022-10-10 PROCEDURE — 99214 OFFICE O/P EST MOD 30 MIN: CPT | Mod: 25 | Performed by: FAMILY MEDICINE

## 2022-10-10 PROCEDURE — 90686 IIV4 VACC NO PRSV 0.5 ML IM: CPT | Performed by: FAMILY MEDICINE

## 2022-10-10 PROCEDURE — 90471 IMMUNIZATION ADMIN: CPT | Performed by: FAMILY MEDICINE

## 2022-10-10 RX ORDER — DULOXETIN HYDROCHLORIDE 60 MG/1
60 CAPSULE, DELAYED RELEASE ORAL DAILY
Qty: 100 CAPSULE | Refills: 3 | Status: SHIPPED | OUTPATIENT
Start: 2022-10-10 | End: 2023-09-21 | Stop reason: SDUPTHER

## 2022-10-10 RX ORDER — AMITRIPTYLINE HYDROCHLORIDE 10 MG/1
10 TABLET, FILM COATED ORAL NIGHTLY
Qty: 100 TABLET | Refills: 3 | Status: SHIPPED | OUTPATIENT
Start: 2022-10-10 | End: 2023-09-21 | Stop reason: SDUPTHER

## 2022-10-10 RX ORDER — PROGESTERONE 100 MG/1
CAPSULE ORAL
COMMUNITY
Start: 2022-10-05

## 2022-10-10 ASSESSMENT — PATIENT HEALTH QUESTIONNAIRE - PHQ9: CLINICAL INTERPRETATION OF PHQ2 SCORE: 0

## 2022-10-10 NOTE — ASSESSMENT & PLAN NOTE
We will continue the Cymbalta and amitriptyline.  She seems to be doing well on this combination.  We did discuss the need for moderate exercise and she expressed comprehension.

## 2022-10-10 NOTE — PATIENT INSTRUCTIONS
FU with women's health here at [facility].  Cont with Dr. Miller.  Get labs (TSH)

## 2022-10-10 NOTE — PROGRESS NOTES
Subjective:     Chief Complaint   Patient presents with    Annual Exam     Lotus Saez is a 32 y.o. female here today for     Problem   Abnormal Menses    Lotus states her menses have gotten irregular.  She states sometimes will be close together sometimes far apart.  She states her bleeding is heavier than it used to be.  She recently had a video conference with another physician who started on progesterone.  She states that physician suggested she may want to see gynecologist and possibly get a transvaginal ultrasound.     Graves' Disease    She is on methimazole for Graves' disease, followed by Dr. Alexander Miller.  She says she has    Follow-ups with him.     Chucky Love is here to follow-up for few issues.  She states her fibromyalgia is much better controlled since adding 10 mg of amitriptyline.  She states she only has a few flareups a year, where she used to get 1 almost every month.  She feels she is doing very well from the standpoint.          Current medicines (including changes today)  Current Outpatient Medications   Medication Sig Dispense Refill    progesterone (PROMETRIUM) 100 MG Cap TAKE ONE CAPSULE BY MOUTH AT NIGHT      amitriptyline (ELAVIL) 10 MG Tab Take 1 Tablet by mouth every evening. 100 Tablet 3    DULoxetine (CYMBALTA) 60 MG Cap DR Particles delayed-release capsule Take 1 Capsule by mouth every day. 100 Capsule 3    methimazole (TAPAZOLE) 5 MG Tab Take 1 Tab by mouth every day.  2     No current facility-administered medications for this visit.       Social History     Tobacco Use    Smoking status: Never    Smokeless tobacco: Never   Substance Use Topics    Alcohol use: Never    Drug use: No     Past Medical History:   Diagnosis Date    Fibromyalgia     Graves disease       Family History   Problem Relation Age of Onset    Heart Disease Mother          Objective:     Vitals:    10/10/22 1542   BP: 120/79   BP Location: Right arm   Patient Position: Sitting   BP Cuff Size:  "Adult   Pulse: 72   SpO2: 97%   Weight: 55.2 kg (121 lb 11.2 oz)   Height: 1.499 m (4' 11\")     Body mass index is 24.58 kg/m².     Physical Exam:   Gen: Well developed, well nourished in no acute distress.   Skin: Pink, warm, and dry  HEENT: conjunctiva non-injected, sclera non-icteric. EOMs intact.   Nasal mucosa without edema nor erythema.   Pinna normal.    Oral mucous membranes pink and moist with no lesions.  Neck: Supple, trachea midline. No adenopathy or masses in the neck or supraclavicular regions.  Lungs: Effort is normal. Clear to auscultation bilaterally with good excursion.  CV: regular rate and rhythm, no murmurs  Abdomen: soft, nontender, + BS. No HSM.  No CVAT  Ext: no edema, color normal, vascularity normal, temperature normal  Alert and oriented Eye contact is good, speech goal directed, affect calm    Assessment and Plan:   Fibromyalgia  We will continue the Cymbalta and amitriptyline.  She seems to be doing well on this combination.  We did discuss the need for moderate exercise and she expressed comprehension.    Graves' disease  I recommend she continues to follow-up with Dr. Miller.    Abnormal menses  Don had a battery of blood test but did not have a TSH, so I will add that.  I did not see anything on the blood test that suggests any significant pathology.  She is requesting to see female provider so I recommend she schedule with either one of our female physician attendings or with a resident with 1 of these attendings precepting.  She seems comfortable with this plan.    Followup: Return in about 4 weeks (around 11/7/2022).  She would like to have her Pap smear done at the time of her next visit if possible.  Alexander Mendoza M.D.    "

## 2022-10-10 NOTE — ASSESSMENT & PLAN NOTE
Don had a battery of blood test but did not have a TSH, so I will add that.  I did not see anything on the blood test that suggests any significant pathology.  She is requesting to see female provider so I recommend she schedule with either one of our female physician attendings or with a resident with 1 of these attendings precepting.  She seems comfortable with this plan.

## 2022-12-02 ENCOUNTER — OFFICE VISIT (OUTPATIENT)
Dept: MEDICAL GROUP | Facility: CLINIC | Age: 32
End: 2022-12-02
Payer: COMMERCIAL

## 2022-12-02 ENCOUNTER — HOSPITAL ENCOUNTER (OUTPATIENT)
Facility: MEDICAL CENTER | Age: 32
End: 2022-12-02
Payer: COMMERCIAL

## 2022-12-02 DIAGNOSIS — N93.8 OTHER SPECIFIED ABNORMAL UTERINE AND VAGINAL BLEEDING: ICD-10-CM

## 2022-12-02 DIAGNOSIS — Z12.4 CERVICAL CANCER SCREENING: ICD-10-CM

## 2022-12-02 DIAGNOSIS — E28.2 PCOS (POLYCYSTIC OVARIAN SYNDROME): ICD-10-CM

## 2022-12-02 PROCEDURE — 99213 OFFICE O/P EST LOW 20 MIN: CPT | Mod: GC

## 2022-12-02 PROCEDURE — 88175 CYTOPATH C/V AUTO FLUID REDO: CPT

## 2022-12-03 LAB — CYTOLOGY REG CYTOL: NORMAL

## 2022-12-03 NOTE — PROGRESS NOTES
"Subjective:     CC: Abnormal menses    HPI:   Lotus with pmhx fibromyalgia and Graves' disease who presents today for abnormal menses.  Patient is concerned that she will have bleeding for 10 days at a time during her menstrual cycles.  She also states that her menstrual cycles are typically 25 days apart which worries her.  She does report monthly menstrual cycles.  Patient has a history of acne during childhood but does not currently have a history of acne.  She reports excessive hair along her jaw.  Patient was previously seeing Dr. Hurt who started her on daily progesterone and told her that she may have PCOS.  It was recommended that she get a transvaginal ultrasound to see if she has cysts in her ovaries.  Patient is not currently interested in starting OCPs or any other medications for PCOS.    Problem   Cervical Cancer Screening   Other Specified Abnormal Uterine and Vaginal Bleeding       Current Outpatient Medications Ordered in Epic   Medication Sig Dispense Refill    progesterone (PROMETRIUM) 100 MG Cap TAKE ONE CAPSULE BY MOUTH AT NIGHT      amitriptyline (ELAVIL) 10 MG Tab Take 1 Tablet by mouth every evening. 100 Tablet 3    DULoxetine (CYMBALTA) 60 MG Cap DR Particles delayed-release capsule Take 1 Capsule by mouth every day. 100 Capsule 3    methimazole (TAPAZOLE) 5 MG Tab Take 1 Tab by mouth every day.  2     No current [facility]-ordered facility-administered medications on file.       ROS:  ROS as per HPI. Otherwise negative.    Objective:     Exam:  BP (P) 112/68 (BP Location: Right arm, Patient Position: Sitting, BP Cuff Size: Adult)   Pulse (P) 78   Temp (P) 36.3 °C (97.3 °F) (Temporal)   Ht (P) 1.499 m (4' 11\")   Wt (P) 52 kg (114 lb 11.2 oz)   SpO2 (P) 98%   BMI (P) 23.17 kg/m²  Body mass index is 23.17 kg/m² (pended).    Gen: Alert and oriented, No apparent distress.  Neck: Neck is supple without lymphadenopathy.  Lungs: Normal effort, CTA bilaterally, no wheezes, rhonchi, or " rales  CV: Regular rate and rhythm. No murmurs, rubs, or gallops.  Ext: No clubbing, cyanosis, edema.  : Perineum and external genitalia normal without rash. Vagina with white discharge. Cervix without visible lesions or discharge.    Chaperone during exam and pap smear was Wilda Calvo    Assessment & Plan:     32 y.o. female with the following -     Problem List Items Addressed This Visit       Other specified abnormal uterine and vaginal bleeding     Concern for PCOS.  Patient states that she had high testosterone levels at one-point and is reporting excessive hair but no acne. Has regular menstrual cycles.    Plan:  - Transvaginal US ordered         Cervical cancer screening     Pap smear performed today with chaperone present. Will inform patient once resulted.         Relevant Orders    PAP IG, RFX HPV ALL PTH 16&18     Other Visit Diagnoses       PCOS (polycystic ovarian syndrome)        Relevant Orders    US-PELVIC TRANSVAGINAL ONLY            I spent a total of 30 minutes with record review, exam, communication with the patient, communication with other providers, and documentation of this encounter.      Return if symptoms worsen or fail to improve.    Please note that this dictation was created using voice recognition software. I have made every reasonable attempt to correct obvious errors, but I expect that there are errors of grammar and possibly content that I did not discover before finalizing the note.

## 2022-12-03 NOTE — ASSESSMENT & PLAN NOTE
Concern for PCOS.  Patient states that she had high testosterone levels at one-point and is reporting excessive hair but no acne. Has regular menstrual cycles.    Plan:  - Transvaginal US ordered

## 2022-12-27 ENCOUNTER — HOSPITAL ENCOUNTER (OUTPATIENT)
Dept: RADIOLOGY | Facility: MEDICAL CENTER | Age: 32
End: 2022-12-27
Payer: COMMERCIAL

## 2022-12-27 DIAGNOSIS — E28.2 PCOS (POLYCYSTIC OVARIAN SYNDROME): ICD-10-CM

## 2022-12-27 PROCEDURE — 76830 TRANSVAGINAL US NON-OB: CPT

## 2023-06-03 ENCOUNTER — HOSPITAL ENCOUNTER (OUTPATIENT)
Dept: LAB | Facility: MEDICAL CENTER | Age: 33
End: 2023-06-03
Attending: INTERNAL MEDICINE
Payer: COMMERCIAL

## 2023-06-03 LAB
ALBUMIN SERPL BCP-MCNC: 4.5 G/DL (ref 3.2–4.9)
ALBUMIN/GLOB SERPL: 1.7 G/DL
ALP SERPL-CCNC: 64 U/L (ref 30–99)
ALT SERPL-CCNC: 17 U/L (ref 2–50)
ANION GAP SERPL CALC-SCNC: 15 MMOL/L (ref 7–16)
AST SERPL-CCNC: 21 U/L (ref 12–45)
BASOPHILS # BLD AUTO: 0.6 % (ref 0–1.8)
BASOPHILS # BLD: 0.04 K/UL (ref 0–0.12)
BILIRUB SERPL-MCNC: 0.3 MG/DL (ref 0.1–1.5)
BUN SERPL-MCNC: 12 MG/DL (ref 8–22)
CALCIUM ALBUM COR SERPL-MCNC: 8.6 MG/DL (ref 8.5–10.5)
CALCIUM SERPL-MCNC: 9 MG/DL (ref 8.5–10.5)
CHLORIDE SERPL-SCNC: 102 MMOL/L (ref 96–112)
CO2 SERPL-SCNC: 20 MMOL/L (ref 20–33)
CREAT SERPL-MCNC: 0.74 MG/DL (ref 0.5–1.4)
EOSINOPHIL # BLD AUTO: 0.03 K/UL (ref 0–0.51)
EOSINOPHIL NFR BLD: 0.4 % (ref 0–6.9)
ERYTHROCYTE [DISTWIDTH] IN BLOOD BY AUTOMATED COUNT: 42.6 FL (ref 35.9–50)
GFR SERPLBLD CREATININE-BSD FMLA CKD-EPI: 108 ML/MIN/1.73 M 2
GLOBULIN SER CALC-MCNC: 2.7 G/DL (ref 1.9–3.5)
GLUCOSE SERPL-MCNC: 87 MG/DL (ref 65–99)
HCT VFR BLD AUTO: 42.8 % (ref 37–47)
HGB BLD-MCNC: 14.4 G/DL (ref 12–16)
IMM GRANULOCYTES # BLD AUTO: 0.01 K/UL (ref 0–0.11)
IMM GRANULOCYTES NFR BLD AUTO: 0.1 % (ref 0–0.9)
LYMPHOCYTES # BLD AUTO: 2.33 K/UL (ref 1–4.8)
LYMPHOCYTES NFR BLD: 34.4 % (ref 22–41)
MCH RBC QN AUTO: 30.2 PG (ref 27–33)
MCHC RBC AUTO-ENTMCNC: 33.6 G/DL (ref 32.2–35.5)
MCV RBC AUTO: 89.7 FL (ref 81.4–97.8)
MONOCYTES # BLD AUTO: 0.54 K/UL (ref 0–0.85)
MONOCYTES NFR BLD AUTO: 8 % (ref 0–13.4)
NEUTROPHILS # BLD AUTO: 3.83 K/UL (ref 1.82–7.42)
NEUTROPHILS NFR BLD: 56.5 % (ref 44–72)
NRBC # BLD AUTO: 0 K/UL
NRBC BLD-RTO: 0 /100 WBC (ref 0–0.2)
PLATELET # BLD AUTO: 268 K/UL (ref 164–446)
PMV BLD AUTO: 10.9 FL (ref 9–12.9)
POTASSIUM SERPL-SCNC: 4.2 MMOL/L (ref 3.6–5.5)
PROT SERPL-MCNC: 7.2 G/DL (ref 6–8.2)
RBC # BLD AUTO: 4.77 M/UL (ref 4.2–5.4)
SODIUM SERPL-SCNC: 137 MMOL/L (ref 135–145)
T3 SERPL-MCNC: 123 NG/DL (ref 60–181)
T4 FREE SERPL-MCNC: 1.44 NG/DL (ref 0.93–1.7)
TSH SERPL DL<=0.005 MIU/L-ACNC: 0.67 UIU/ML (ref 0.38–5.33)
WBC # BLD AUTO: 6.8 K/UL (ref 4.8–10.8)

## 2023-06-03 PROCEDURE — 80053 COMPREHEN METABOLIC PANEL: CPT

## 2023-06-03 PROCEDURE — 84439 ASSAY OF FREE THYROXINE: CPT

## 2023-06-03 PROCEDURE — 84480 ASSAY TRIIODOTHYRONINE (T3): CPT

## 2023-06-03 PROCEDURE — 84443 ASSAY THYROID STIM HORMONE: CPT

## 2023-06-03 PROCEDURE — 36415 COLL VENOUS BLD VENIPUNCTURE: CPT

## 2023-06-03 PROCEDURE — 85025 COMPLETE CBC W/AUTO DIFF WBC: CPT

## 2023-09-21 ENCOUNTER — OFFICE VISIT (OUTPATIENT)
Dept: MEDICAL GROUP | Facility: CLINIC | Age: 33
End: 2023-09-21
Payer: COMMERCIAL

## 2023-09-21 VITALS
HEIGHT: 59 IN | BODY MASS INDEX: 22.5 KG/M2 | DIASTOLIC BLOOD PRESSURE: 83 MMHG | OXYGEN SATURATION: 98 % | TEMPERATURE: 98.8 F | WEIGHT: 111.6 LBS | SYSTOLIC BLOOD PRESSURE: 127 MMHG | HEART RATE: 77 BPM

## 2023-09-21 DIAGNOSIS — M79.7 FIBROMYALGIA: ICD-10-CM

## 2023-09-21 DIAGNOSIS — Z83.3 FAMILY HISTORY OF DIABETES MELLITUS: ICD-10-CM

## 2023-09-21 DIAGNOSIS — Z13.1 SCREENING FOR DIABETES MELLITUS: ICD-10-CM

## 2023-09-21 DIAGNOSIS — Z13.220 LIPID SCREENING: ICD-10-CM

## 2023-09-21 PROCEDURE — 3079F DIAST BP 80-89 MM HG: CPT | Performed by: BEHAVIOR ANALYST

## 2023-09-21 PROCEDURE — 99395 PREV VISIT EST AGE 18-39: CPT | Mod: GE,EP | Performed by: BEHAVIOR ANALYST

## 2023-09-21 PROCEDURE — 3074F SYST BP LT 130 MM HG: CPT | Performed by: BEHAVIOR ANALYST

## 2023-09-21 RX ORDER — DULOXETIN HYDROCHLORIDE 60 MG/1
60 CAPSULE, DELAYED RELEASE ORAL DAILY
Qty: 100 CAPSULE | Refills: 3 | Status: SHIPPED | OUTPATIENT
Start: 2023-09-21

## 2023-09-21 RX ORDER — AMITRIPTYLINE HYDROCHLORIDE 10 MG/1
10 TABLET, FILM COATED ORAL NIGHTLY
Qty: 100 TABLET | Refills: 3 | Status: SHIPPED | OUTPATIENT
Start: 2023-09-21

## 2023-09-21 ASSESSMENT — FIBROSIS 4 INDEX: FIB4 SCORE: 0.67

## 2023-09-21 ASSESSMENT — PATIENT HEALTH QUESTIONNAIRE - PHQ9: CLINICAL INTERPRETATION OF PHQ2 SCORE: 0

## 2023-09-21 NOTE — PROGRESS NOTES
"Subjective:     CC: well visit    HPI:   Lotus Gautam presents for a well visit, patient has a history of fibromyalgia and hypothyroidism on methimazole followed by endocrinology; patient was last saw Endo and did a work-up of her thyroid in June and everything was unremarkable.  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  She does not report any depression or anxiety, sleep disturbances or appetite changes.    #Fibromyalgia flares  -Patient educated of adverse effects of going up on the dose of amitriptyline, and maximized pharmacotherapy dosing of duloxetine; shared decision-making was to have a conservative approach and focus on stress reduction and healthy eating and adequate sleep;   -pt to keep sx diary as well  -CBC/CMP screen for physiologic process that could be concerning for etiology of myalgia  -f/u in 4-6wks and discuss disease process    #Wellness  #Need for Pap smear  -Patient has had a Pap smear in the last 3 years  -Lipid screening ordered  -Family history of diabetes, order A1c CMP    No problems updated.    ROS: See HPI.     Objective:     Exam:  /83 (BP Location: Right arm, Patient Position: Sitting, BP Cuff Size: Adult)   Pulse 77   Temp 37.1 °C (98.8 °F) (Temporal)   Ht 1.499 m (4' 11\")   Wt 50.6 kg (111 lb 9.6 oz)   SpO2 98%   BMI 22.54 kg/m²  Body mass index is 22.54 " kg/m².    Physical Exam:  General: Pt resting in NAD, cooperative   Skin:  No cyanosis or jaundice   HEENT: NC/AT. EOMI. No conjunctival injection or sclera icterus.   Lungs:  CTAB, good air movement. Non-labored.   Cardiovascular:  S1/S2 RRR   Abdomen:  Abdomen is soft, non-tender, non-distended, +BS  Extremities:  No LE edema   CNS: A/O x4; CN through C12 intact ;no gross focal neurologic deficits  Psych: Appropriate mood and affect         Assessment & Plan:     See hpi    Problem List Items Addressed This Visit       Fibromyalgia    Relevant Medications    amitriptyline (ELAVIL) 10 MG Tab    DULoxetine (CYMBALTA) 60 MG Cap DR Particles delayed-release capsule    Other Relevant Orders    Comp Metabolic Panel     Other Visit Diagnoses       Screening for diabetes mellitus        Relevant Orders    HEMOGLOBIN A1C    Family history of diabetes mellitus        Relevant Orders    HEMOGLOBIN A1C    Lipid screening        Relevant Orders    Lipid Profile

## 2024-06-15 ENCOUNTER — HOSPITAL ENCOUNTER (OUTPATIENT)
Dept: LAB | Facility: MEDICAL CENTER | Age: 34
End: 2024-06-15
Attending: BEHAVIOR ANALYST
Payer: COMMERCIAL

## 2024-06-15 DIAGNOSIS — M79.7 FIBROMYALGIA: ICD-10-CM

## 2024-06-15 DIAGNOSIS — Z83.3 FAMILY HISTORY OF DIABETES MELLITUS: ICD-10-CM

## 2024-06-15 DIAGNOSIS — Z13.220 LIPID SCREENING: ICD-10-CM

## 2024-06-15 DIAGNOSIS — Z13.1 SCREENING FOR DIABETES MELLITUS: ICD-10-CM

## 2024-06-15 LAB
ALBUMIN SERPL BCP-MCNC: 4.2 G/DL (ref 3.2–4.9)
ALBUMIN/GLOB SERPL: 1.4 G/DL
ALP SERPL-CCNC: 60 U/L (ref 30–99)
ALT SERPL-CCNC: 15 U/L (ref 2–50)
ANION GAP SERPL CALC-SCNC: 12 MMOL/L (ref 7–16)
AST SERPL-CCNC: 21 U/L (ref 12–45)
BILIRUB SERPL-MCNC: 0.4 MG/DL (ref 0.1–1.5)
BUN SERPL-MCNC: 13 MG/DL (ref 8–22)
CALCIUM ALBUM COR SERPL-MCNC: 9.1 MG/DL (ref 8.5–10.5)
CALCIUM SERPL-MCNC: 9.3 MG/DL (ref 8.5–10.5)
CHLORIDE SERPL-SCNC: 107 MMOL/L (ref 96–112)
CHOLEST SERPL-MCNC: 153 MG/DL (ref 100–199)
CO2 SERPL-SCNC: 22 MMOL/L (ref 20–33)
CREAT SERPL-MCNC: 0.74 MG/DL (ref 0.5–1.4)
EST. AVERAGE GLUCOSE BLD GHB EST-MCNC: 100 MG/DL
GFR SERPLBLD CREATININE-BSD FMLA CKD-EPI: 108 ML/MIN/1.73 M 2
GLOBULIN SER CALC-MCNC: 2.9 G/DL (ref 1.9–3.5)
GLUCOSE SERPL-MCNC: 89 MG/DL (ref 65–99)
HBA1C MFR BLD: 5.1 % (ref 4–5.6)
HDLC SERPL-MCNC: 84 MG/DL
LDLC SERPL CALC-MCNC: 60 MG/DL
POTASSIUM SERPL-SCNC: 5 MMOL/L (ref 3.6–5.5)
PROT SERPL-MCNC: 7.1 G/DL (ref 6–8.2)
SODIUM SERPL-SCNC: 141 MMOL/L (ref 135–145)
TRIGL SERPL-MCNC: 46 MG/DL (ref 0–149)

## 2024-06-15 PROCEDURE — 80053 COMPREHEN METABOLIC PANEL: CPT

## 2024-06-15 PROCEDURE — 83036 HEMOGLOBIN GLYCOSYLATED A1C: CPT

## 2024-06-15 PROCEDURE — 80061 LIPID PANEL: CPT

## 2024-06-15 PROCEDURE — 36415 COLL VENOUS BLD VENIPUNCTURE: CPT

## 2024-09-17 ENCOUNTER — OFFICE VISIT (OUTPATIENT)
Dept: URGENT CARE | Facility: PHYSICIAN GROUP | Age: 34
End: 2024-09-17
Payer: COMMERCIAL

## 2024-09-17 VITALS
HEART RATE: 68 BPM | RESPIRATION RATE: 16 BRPM | OXYGEN SATURATION: 98 % | TEMPERATURE: 98.1 F | SYSTOLIC BLOOD PRESSURE: 118 MMHG | BODY MASS INDEX: 21.57 KG/M2 | WEIGHT: 107 LBS | DIASTOLIC BLOOD PRESSURE: 74 MMHG | HEIGHT: 59 IN

## 2024-09-17 DIAGNOSIS — H65.192 OTHER ACUTE NONSUPPURATIVE OTITIS MEDIA OF LEFT EAR, RECURRENCE NOT SPECIFIED: ICD-10-CM

## 2024-09-17 PROCEDURE — 99213 OFFICE O/P EST LOW 20 MIN: CPT | Performed by: NURSE PRACTITIONER

## 2024-09-17 PROCEDURE — 3078F DIAST BP <80 MM HG: CPT | Performed by: NURSE PRACTITIONER

## 2024-09-17 PROCEDURE — 3074F SYST BP LT 130 MM HG: CPT | Performed by: NURSE PRACTITIONER

## 2024-09-17 ASSESSMENT — FIBROSIS 4 INDEX: FIB4 SCORE: 0.73

## 2024-09-17 ASSESSMENT — ENCOUNTER SYMPTOMS
MUSCULOSKELETAL NEGATIVE: 1
SORE THROAT: 0
NEUROLOGICAL NEGATIVE: 1
EYE REDNESS: 0
SINUS PAIN: 0
COUGH: 0
EYE DISCHARGE: 0
GASTROINTESTINAL NEGATIVE: 1
CONSTITUTIONAL NEGATIVE: 1

## 2024-09-17 NOTE — PROGRESS NOTES
Subjective     Lotus Saez is a 34 y.o. female who presents with Otalgia (Left ear pain x 2 days)            Otalgia   Pertinent negatives include no coughing or sore throat.   Lotus has come into urgent care today as she has been experiencing acute left ear pain x 2 days.  States recently just got over COVID in the last couple weeks.  Denies nasal congestion or runny nose at this time but is having some mild postnasal drainage with a scratchy dry throat.  Denies fever.      PMH:  has a past medical history of Fibromyalgia and Graves disease.  MEDS:   Current Outpatient Medications:     amoxicillin-clavulanate (AUGMENTIN) 875-125 MG Tab, Take 1 Tablet by mouth 2 times a day for 5 days., Disp: 10 Tablet, Rfl: 0    DULoxetine (CYMBALTA) 60 MG Cap DR Particles delayed-release capsule, Take 1 Capsule by mouth every day., Disp: 100 Capsule, Rfl: 3    methimazole (TAPAZOLE) 5 MG Tab, Take 1 Tab by mouth every day., Disp: , Rfl: 2    amitriptyline (ELAVIL) 10 MG Tab, Take 1 Tablet by mouth every evening. (Patient not taking: Reported on 9/17/2024), Disp: 100 Tablet, Rfl: 3    progesterone (PROMETRIUM) 100 MG Cap, TAKE ONE CAPSULE BY MOUTH AT NIGHT (Patient not taking: Reported on 9/17/2024), Disp: , Rfl:   ALLERGIES: No Known Allergies  SURGHX: History reviewed. No pertinent surgical history.  SOCHX:  reports that she has never smoked. She has never used smokeless tobacco. She reports that she does not drink alcohol and does not use drugs.  FH: Family history was reviewed, no pertinent findings to report      Review of Systems   Constitutional: Negative.    HENT:  Positive for congestion and ear pain. Negative for sinus pain and sore throat.    Eyes:  Negative for discharge and redness.   Respiratory:  Negative for cough.    Gastrointestinal: Negative.    Musculoskeletal: Negative.    Neurological: Negative.    All other systems reviewed and are negative.             Objective     /74   Pulse 68   Temp 36.7 °C  "(98.1 °F)   Resp 16   Ht 1.499 m (4' 11\")   Wt 48.5 kg (107 lb)   SpO2 98%   BMI 21.61 kg/m²      Physical Exam  Vitals reviewed.   Constitutional:       General: She is awake. She is not in acute distress.     Appearance: Normal appearance. She is not ill-appearing, toxic-appearing or diaphoretic.   HENT:      Right Ear: Ear canal and external ear normal. There is impacted cerumen.      Left Ear: A middle ear effusion is present. Tympanic membrane is not erythematous or bulging.      Ears:      Comments: Left ear canal erythema.     Nose: Nose normal.      Mouth/Throat:      Lips: Pink.      Mouth: Mucous membranes are dry.      Pharynx: Oropharynx is clear. Uvula midline.   Cardiovascular:      Rate and Rhythm: Normal rate.   Pulmonary:      Effort: Pulmonary effort is normal.   Skin:     General: Skin is warm and dry.   Neurological:      Mental Status: She is alert and oriented to person, place, and time.   Psychiatric:         Attention and Perception: Attention normal.         Mood and Affect: Mood normal.         Speech: Speech normal.         Behavior: Behavior normal. Behavior is cooperative.                             Assessment & Plan        Assessment & Plan  Other acute nonsuppurative otitis media of left ear, recurrence not specified    Orders:    amoxicillin-clavulanate (AUGMENTIN) 875-125 MG Tab; Take 1 Tablet by mouth 2 times a day for 5 days.       -May use over the counter Ibuprofen/Tylenol for any fever or ear/sinus pain  -May use over the counter longer acting allergy medication for any sinus congestion/post nasal drip related to ear pressure (chose one: Claritin/Zyrtec/Allegra without decongestant) x 1 week then as needed   -May use saline nasal spray for any nasal congestion/post nasal drip as needed up to 4x/day   -May use over the counter nasal decongestant like Flonase/Nasacort as needed for sinus congestion related to ear pressures x 1 week then as needed   -Maintain hydration status "   -Avoid head immersion into water  -Monitor for fevers, difficulty or abnormal hearing, pain in ears, headache, dizziness- need re-evaluation in urgent care

## 2024-10-21 ENCOUNTER — APPOINTMENT (OUTPATIENT)
Dept: MEDICAL GROUP | Facility: CLINIC | Age: 34
End: 2024-10-21
Payer: COMMERCIAL

## 2024-11-18 ENCOUNTER — APPOINTMENT (OUTPATIENT)
Dept: MEDICAL GROUP | Facility: CLINIC | Age: 34
End: 2024-11-18
Payer: COMMERCIAL

## 2024-11-18 VITALS
BODY MASS INDEX: 24.8 KG/M2 | SYSTOLIC BLOOD PRESSURE: 136 MMHG | WEIGHT: 123 LBS | HEIGHT: 59 IN | DIASTOLIC BLOOD PRESSURE: 86 MMHG | TEMPERATURE: 98 F | OXYGEN SATURATION: 95 % | HEART RATE: 65 BPM

## 2024-11-18 DIAGNOSIS — Z00.00 WELL ADULT EXAM: ICD-10-CM

## 2024-11-18 DIAGNOSIS — Z00.00 ENCOUNTER FOR PREVENTIVE CARE: ICD-10-CM

## 2024-11-18 PROCEDURE — 3075F SYST BP GE 130 - 139MM HG: CPT | Performed by: FAMILY MEDICINE

## 2024-11-18 PROCEDURE — 3079F DIAST BP 80-89 MM HG: CPT | Performed by: FAMILY MEDICINE

## 2024-11-18 PROCEDURE — 99385 PREV VISIT NEW AGE 18-39: CPT | Performed by: FAMILY MEDICINE

## 2024-11-18 ASSESSMENT — FIBROSIS 4 INDEX: FIB4 SCORE: 0.73

## 2024-11-19 NOTE — PROGRESS NOTES
Subjective:     Chief Complaint   Patient presents with    Annual Exam     Lotus Saez is a 34 y.o. female here today for     Problem   Well Adult Exam    Lotus is here today for an annual well check.  She is not due for Pap smear until 2027.  She has 2 children at home ages 9 and 12.  She states this limits her ability to get out and get exercise but she realizes this is important.  She states she does eat a fairly healthy diet.  She does not feel like she is depressed or overly anxious.  Her medical issues include fibromyalgia for which she is take Cymbalta and states this is very helpful.  When her youngest child was born she had been off Cymbalta during the pregnancy and then off for breast-feeding for total about 2 years.  She did feel more symptomatic at that time and has felt better since going back on the Cymbalta.    She also has hyperparathyroidism which is managed by Dr. Alexander Miller she is on methimazole and states her labs have been stable and she will try to schedule appoint with Dr. Miller.          Current medicines (including changes today)  Current Outpatient Medications   Medication Sig Dispense Refill    DULoxetine (CYMBALTA) 60 MG Cap DR Particles delayed-release capsule Take 1 Capsule by mouth every day. 100 Capsule 3    methimazole (TAPAZOLE) 5 MG Tab Take 1 Tab by mouth every day.  2     No current facility-administered medications for this visit.       Social History     Tobacco Use    Smoking status: Never    Smokeless tobacco: Never   Vaping Use    Vaping status: Never Used   Substance Use Topics    Alcohol use: Never    Drug use: No     Past Medical History:   Diagnosis Date    Fibromyalgia     Graves disease       Family History   Problem Relation Age of Onset    Heart Disease Mother          Objective:     Vitals:    11/18/24 1631   BP: 136/86   BP Location: Left arm   Patient Position: Sitting   BP Cuff Size: Adult   Pulse: 65   Temp: 36.7 °C (98 °F)   TempSrc: Temporal   SpO2:  "95%   Weight: 55.8 kg (123 lb)   Height: 1.499 m (4' 11\")     Body mass index is 24.84 kg/m².     Physical Exam:   Gen: Well developed, well nourished in no acute distress.   Skin: Pink, warm, and dry  HEENT: conjunctiva non-injected, sclera non-icteric. EOMs intact.   Nasal mucosa without edema nor erythema.   Pinna normal.    Oral mucous membranes pink and moist with no lesions.  Neck: Supple, trachea midline. No adenopathy or masses in the neck or supraclavicular regions.  Lungs: Effort is normal. Clear to auscultation bilaterally with good excursion.  CV: regular rate and rhythm, no murmurs  Ext: no edema, color normal, vascularity normal, temperature normal  Alert and oriented Eye contact is good, speech goal directed, affect calm    Assessment and Plan:   Well adult exam  She appears to be taking good care of herself but could increase her exercise.  I stressed the need for mild to moderate exercise rather than intense exercise with fibromyalgia.  We did discuss sleep which she states is okay but could be better, but with 2 children at home it is hard to get excellent sleep.  We reviewed her immunizations and she states she is getting get the flu shot and probably the COVID shot this year.  She did recently have COVID so there is a?  Around that.  We do not have records of her hepatitis immunizations but she is pretty sure she got those in [place] and will try and get us those records.      Alexander Mendoza M.D.    "

## 2024-11-19 NOTE — ASSESSMENT & PLAN NOTE
She appears to be taking good care of herself but could increase her exercise.  I stressed the need for mild to moderate exercise rather than intense exercise with fibromyalgia.  We did discuss sleep which she states is okay but could be better, but with 2 children at home it is hard to get excellent sleep.  We reviewed her immunizations and she states she is getting get the flu shot and probably the COVID shot this year.  She did recently have COVID so there is a?  Around that.  We do not have records of her hepatitis immunizations but she is pretty sure she got those in [place] and will try and get us those records.

## 2025-02-05 DIAGNOSIS — M79.7 FIBROMYALGIA: ICD-10-CM

## 2025-02-06 NOTE — TELEPHONE ENCOUNTER
Received request via: Pharmacy    Was the patient seen in the last year in this department? Yes    Does the patient have an active prescription (recently filled or refills available) for medication(s) requested? No    Pharmacy Name: [facility] [id number] [address] - [address]

## 2025-02-07 RX ORDER — DULOXETIN HYDROCHLORIDE 60 MG/1
60 CAPSULE, DELAYED RELEASE ORAL DAILY
Qty: 90 CAPSULE | Refills: 3 | Status: SHIPPED | OUTPATIENT
Start: 2025-02-07